# Patient Record
Sex: FEMALE | Employment: FULL TIME | URBAN - METROPOLITAN AREA
[De-identification: names, ages, dates, MRNs, and addresses within clinical notes are randomized per-mention and may not be internally consistent; named-entity substitution may affect disease eponyms.]

---

## 2023-04-14 PROBLEM — R31.9 HEMATURIA: Status: ACTIVE | Noted: 2023-04-14

## 2023-04-14 PROBLEM — J03.90 ACUTE TONSILLITIS: Status: RESOLVED | Noted: 2023-04-14 | Resolved: 2023-04-14

## 2023-04-14 PROBLEM — I10 ESSENTIAL HYPERTENSION: Status: ACTIVE | Noted: 2023-04-14

## 2023-04-14 PROBLEM — J02.9 SORE THROAT: Status: RESOLVED | Noted: 2023-04-14 | Resolved: 2023-04-14

## 2023-04-14 PROBLEM — R79.89 HIGH SERUM CHOLESTANOL: Status: ACTIVE | Noted: 2023-04-14

## 2023-04-14 PROBLEM — L72.9 SKIN CYST: Status: RESOLVED | Noted: 2023-04-14 | Resolved: 2023-04-14

## 2023-04-14 PROBLEM — N00.9 GLOMERULONEPHRITIS, ACUTE: Status: RESOLVED | Noted: 2023-04-14 | Resolved: 2023-04-14

## 2023-04-14 PROBLEM — R80.9 PROTEINURIA: Status: ACTIVE | Noted: 2023-04-14

## 2023-04-14 PROBLEM — R09.A2 GLOBUS SENSATION: Status: ACTIVE | Noted: 2023-04-14

## 2023-04-14 PROBLEM — N02.B9 IGA NEPHROPATHY: Status: ACTIVE | Noted: 2023-04-14

## 2023-04-14 RX ORDER — LISINOPRIL 10 MG/1
1 TABLET ORAL DAILY
COMMUNITY
Start: 2018-05-24 | End: 2023-11-30

## 2023-04-18 ENCOUNTER — OFFICE VISIT (OUTPATIENT)
Dept: PRIMARY CARE | Facility: CLINIC | Age: 43
End: 2023-04-18
Payer: COMMERCIAL

## 2023-04-18 VITALS
DIASTOLIC BLOOD PRESSURE: 75 MMHG | BODY MASS INDEX: 22.71 KG/M2 | HEIGHT: 64 IN | HEART RATE: 86 BPM | WEIGHT: 133 LBS | SYSTOLIC BLOOD PRESSURE: 110 MMHG

## 2023-04-18 DIAGNOSIS — I10 ESSENTIAL HYPERTENSION: ICD-10-CM

## 2023-04-18 DIAGNOSIS — Z12.31 SCREENING MAMMOGRAM, ENCOUNTER FOR: ICD-10-CM

## 2023-04-18 DIAGNOSIS — N02.B9 IGA NEPHROPATHY: ICD-10-CM

## 2023-04-18 DIAGNOSIS — Z13.220 LIPID SCREENING: ICD-10-CM

## 2023-04-18 DIAGNOSIS — Z13.29 THYROID DISORDER SCREENING: ICD-10-CM

## 2023-04-18 DIAGNOSIS — Z00.00 ROUTINE GENERAL MEDICAL EXAMINATION AT A HEALTH CARE FACILITY: Primary | ICD-10-CM

## 2023-04-18 PROCEDURE — 84439 ASSAY OF FREE THYROXINE: CPT

## 2023-04-18 PROCEDURE — 85025 COMPLETE CBC W/AUTO DIFF WBC: CPT

## 2023-04-18 PROCEDURE — 1036F TOBACCO NON-USER: CPT | Performed by: FAMILY MEDICINE

## 2023-04-18 PROCEDURE — 36415 COLL VENOUS BLD VENIPUNCTURE: CPT | Performed by: FAMILY MEDICINE

## 2023-04-18 PROCEDURE — 99386 PREV VISIT NEW AGE 40-64: CPT | Performed by: FAMILY MEDICINE

## 2023-04-18 PROCEDURE — 80053 COMPREHEN METABOLIC PANEL: CPT

## 2023-04-18 PROCEDURE — 3074F SYST BP LT 130 MM HG: CPT | Performed by: FAMILY MEDICINE

## 2023-04-18 PROCEDURE — 84443 ASSAY THYROID STIM HORMONE: CPT

## 2023-04-18 PROCEDURE — 3078F DIAST BP <80 MM HG: CPT | Performed by: FAMILY MEDICINE

## 2023-04-18 PROCEDURE — 80061 LIPID PANEL: CPT

## 2023-04-18 ASSESSMENT — PATIENT HEALTH QUESTIONNAIRE - PHQ9
SUM OF ALL RESPONSES TO PHQ9 QUESTIONS 1 AND 2: 0
1. LITTLE INTEREST OR PLEASURE IN DOING THINGS: NOT AT ALL
2. FEELING DOWN, DEPRESSED OR HOPELESS: NOT AT ALL

## 2023-04-18 NOTE — PROGRESS NOTES
"Reason for Visit: Annual Physical Exam    HPI: Patient is here to establish care has history of IgA nephropathy takes lisinopril blood pressures controlled sees nephrology has not seen primary care in a while  Has had new onset irregular periods spacing out by 45 days sometimes heavy images new has not had a Pap smear needs a mammogram order has not had colonoscopy is up-to-date immunization  Mom with thyroid    Active Problem List  Patient Active Problem List   Diagnosis    Essential hypertension    Globus sensation    Hematuria    High serum cholestanol    IgA nephropathy    Proteinuria    Routine general medical examination at a health care facility    Abnormal uterine bleeding       Comprehensive Medical/Surgical/Social/Family History  Past Medical History:   Diagnosis Date    Acute tonsillitis 04/14/2023    Glomerulonephritis, acute 04/14/2023    Other specified health status     No pertinent past medical history    Skin cyst 04/14/2023    Sore throat 04/14/2023     Past Surgical History:   Procedure Laterality Date    OTHER SURGICAL HISTORY  03/21/2019    No history of surgery     Social History     Social History Narrative    Not on file         Allergies and Medications  Patient has no known allergies.  Current Outpatient Medications on File Prior to Visit   Medication Sig Dispense Refill    fish oil concentrate (Omega-3) 120-180 mg capsule Take 1 capsule (1 g) by mouth in the morning and 1 capsule (1 g) before bedtime.      lisinopril 10 mg tablet Take 1 tablet (10 mg) by mouth once daily.       No current facility-administered medications on file prior to visit.         ROS otherwise negative aside from what was mentioned above in HPI.    Vitals  Blood Pressure 110/75   Pulse 86   Height 1.626 m (5' 4\")   Weight 60.3 kg (133 lb)   Body Mass Index 22.83 kg/m²   Body mass index is 22.83 kg/m².  Physical Exam  Gen: Alert, NAD  HEENT:  PERRLA, EOMI, conjunctiva and sclera normal in appearance. External " auditory canals/TMs normal; Oral cavity and posterior pharynx without lesions/exudate  Neck:  Supple with FROM; No masses/nodes palpable; Thyroid nontender and without nodules; No NEVILLE  Respiratory:  Lungs CTAB  Cardiovascular:  Heart RRR. No M/R/G. Peripheral pulses equal bilaterally  Abdomen:  Soft, nontender, BS present throughout; No R/G/R; No HSM or masses palpated  Extremities:  FROM all extremities; Muscle strength grossly normal with good tone  Neuro:  CN II-XII intact; Reflexes 2+/2+; Gross motor and sensory intact  Skin:  No suspicious lesions present    Assessment and Plan:  Problem List Items Addressed This Visit       Essential hypertension    Relevant Orders    Comprehensive metabolic panel (Completed)    CBC and Auto Differential (Completed)    Lipid Panel (Completed)    TSH with reflex to Free T4 if abnormal (Completed)    Thyroxine, Free (Completed)    IgA nephropathy    Routine general medical examination at a health care facility - Primary    Current Assessment & Plan     -Nutrition: Stressed importance of moderation in sodium/caffeine intake, saturated fat and cholesterol, caloric balance, sufficient intake of fresh fruits, vegetables, fiber, ---Exercise: Stressed the importance of regular exercise.   --Injury prevention: Discussed safety belts, safety helmets, smoke detector, smoking near bedding or upholstery.   --Dental health: Discussed importance of regular tooth brushing, flossing, and dental visits.  --Immunizations reviewed.  --Discussed benefits of screening colonoscopy.  --After hours service discussed with patient           Other Visit Diagnoses       Screening mammogram, encounter for        Relevant Orders    BI mammo bilateral screening tomosynthesis    Thyroid disorder screening        Relevant Orders    Comprehensive metabolic panel (Completed)    CBC and Auto Differential (Completed)    TSH with reflex to Free T4 if abnormal (Completed)    Thyroxine, Free (Completed)    Lipid  screening        Relevant Orders    Comprehensive metabolic panel (Completed)    CBC and Auto Differential (Completed)    Lipid Panel (Completed)

## 2023-04-19 LAB
ALANINE AMINOTRANSFERASE (SGPT) (U/L) IN SER/PLAS: 14 U/L (ref 7–45)
ALBUMIN (G/DL) IN SER/PLAS: 4 G/DL (ref 3.4–5)
ALKALINE PHOSPHATASE (U/L) IN SER/PLAS: 43 U/L (ref 33–110)
ANION GAP IN SER/PLAS: 11 MMOL/L (ref 10–20)
ASPARTATE AMINOTRANSFERASE (SGOT) (U/L) IN SER/PLAS: 19 U/L (ref 9–39)
BASOPHILS (10*3/UL) IN BLOOD BY AUTOMATED COUNT: 0.04 X10E9/L (ref 0–0.1)
BASOPHILS/100 LEUKOCYTES IN BLOOD BY AUTOMATED COUNT: 0.7 % (ref 0–2)
BILIRUBIN TOTAL (MG/DL) IN SER/PLAS: 0.5 MG/DL (ref 0–1.2)
CALCIUM (MG/DL) IN SER/PLAS: 9.2 MG/DL (ref 8.6–10.6)
CARBON DIOXIDE, TOTAL (MMOL/L) IN SER/PLAS: 25 MMOL/L (ref 21–32)
CHLORIDE (MMOL/L) IN SER/PLAS: 107 MMOL/L (ref 98–107)
CHOLESTEROL (MG/DL) IN SER/PLAS: 182 MG/DL (ref 0–199)
CHOLESTEROL IN HDL (MG/DL) IN SER/PLAS: 54 MG/DL
CHOLESTEROL/HDL RATIO: 3.4
CREATININE (MG/DL) IN SER/PLAS: 0.67 MG/DL (ref 0.5–1.05)
EOSINOPHILS (10*3/UL) IN BLOOD BY AUTOMATED COUNT: 0.15 X10E9/L (ref 0–0.7)
EOSINOPHILS/100 LEUKOCYTES IN BLOOD BY AUTOMATED COUNT: 2.7 % (ref 0–6)
ERYTHROCYTE DISTRIBUTION WIDTH (RATIO) BY AUTOMATED COUNT: 13.2 % (ref 11.5–14.5)
ERYTHROCYTE MEAN CORPUSCULAR HEMOGLOBIN CONCENTRATION (G/DL) BY AUTOMATED: 31.1 G/DL (ref 32–36)
ERYTHROCYTE MEAN CORPUSCULAR VOLUME (FL) BY AUTOMATED COUNT: 92 FL (ref 80–100)
ERYTHROCYTES (10*6/UL) IN BLOOD BY AUTOMATED COUNT: 3.9 X10E12/L (ref 4–5.2)
GFR FEMALE: >90 ML/MIN/1.73M2
GLUCOSE (MG/DL) IN SER/PLAS: 74 MG/DL (ref 74–99)
HEMATOCRIT (%) IN BLOOD BY AUTOMATED COUNT: 36 % (ref 36–46)
HEMOGLOBIN (G/DL) IN BLOOD: 11.2 G/DL (ref 12–16)
IMMATURE GRANULOCYTES/100 LEUKOCYTES IN BLOOD BY AUTOMATED COUNT: 0.2 % (ref 0–0.9)
LDL: 108 MG/DL (ref 0–99)
LEUKOCYTES (10*3/UL) IN BLOOD BY AUTOMATED COUNT: 5.5 X10E9/L (ref 4.4–11.3)
LYMPHOCYTES (10*3/UL) IN BLOOD BY AUTOMATED COUNT: 2.16 X10E9/L (ref 1.2–4.8)
LYMPHOCYTES/100 LEUKOCYTES IN BLOOD BY AUTOMATED COUNT: 39.1 % (ref 13–44)
MONOCYTES (10*3/UL) IN BLOOD BY AUTOMATED COUNT: 0.38 X10E9/L (ref 0.1–1)
MONOCYTES/100 LEUKOCYTES IN BLOOD BY AUTOMATED COUNT: 6.9 % (ref 2–10)
NEUTROPHILS (10*3/UL) IN BLOOD BY AUTOMATED COUNT: 2.78 X10E9/L (ref 1.2–7.7)
NEUTROPHILS/100 LEUKOCYTES IN BLOOD BY AUTOMATED COUNT: 50.4 % (ref 40–80)
NRBC (PER 100 WBCS) BY AUTOMATED COUNT: 0 /100 WBC (ref 0–0)
PLATELETS (10*3/UL) IN BLOOD AUTOMATED COUNT: 246 X10E9/L (ref 150–450)
POTASSIUM (MMOL/L) IN SER/PLAS: 4.1 MMOL/L (ref 3.5–5.3)
PROTEIN TOTAL: 6.8 G/DL (ref 6.4–8.2)
SODIUM (MMOL/L) IN SER/PLAS: 139 MMOL/L (ref 136–145)
THYROTROPIN (MIU/L) IN SER/PLAS BY DETECTION LIMIT <= 0.05 MIU/L: 0.22 MIU/L (ref 0.44–3.98)
THYROXINE (T4) FREE (NG/DL) IN SER/PLAS: 1.17 NG/DL (ref 0.78–1.48)
TRIGLYCERIDE (MG/DL) IN SER/PLAS: 102 MG/DL (ref 0–149)
UREA NITROGEN (MG/DL) IN SER/PLAS: 13 MG/DL (ref 6–23)
VLDL: 20 MG/DL (ref 0–40)

## 2023-04-24 DIAGNOSIS — E05.90 HYPERTHYROIDISM: Primary | ICD-10-CM

## 2023-04-30 PROBLEM — Z13.29 THYROID DISORDER SCREENING: Status: ACTIVE | Noted: 2023-04-30

## 2023-04-30 PROBLEM — N93.9 ABNORMAL UTERINE BLEEDING: Status: ACTIVE | Noted: 2023-04-30

## 2023-04-30 PROBLEM — Z13.220 LIPID SCREENING: Status: ACTIVE | Noted: 2023-04-30

## 2023-04-30 PROBLEM — Z00.00 ROUTINE GENERAL MEDICAL EXAMINATION AT A HEALTH CARE FACILITY: Status: ACTIVE | Noted: 2023-04-30

## 2023-04-30 PROBLEM — Z12.31 SCREENING MAMMOGRAM, ENCOUNTER FOR: Status: ACTIVE | Noted: 2023-04-30

## 2023-05-01 ENCOUNTER — TELEPHONE (OUTPATIENT)
Dept: PRIMARY CARE | Facility: CLINIC | Age: 43
End: 2023-05-01
Payer: COMMERCIAL

## 2023-05-01 NOTE — TELEPHONE ENCOUNTER
----- Message from Barry Dahl MD sent at 5/1/2023  3:25 PM EDT -----  Please call the patient regarding her abnormal result.  THYROID shows goiter but no nodules.   Get the other labs as ordered

## 2023-05-02 ENCOUNTER — TELEPHONE (OUTPATIENT)
Dept: PRIMARY CARE | Facility: CLINIC | Age: 43
End: 2023-05-02
Payer: COMMERCIAL

## 2023-05-03 ENCOUNTER — TELEPHONE (OUTPATIENT)
Dept: PRIMARY CARE | Facility: CLINIC | Age: 43
End: 2023-05-03
Payer: COMMERCIAL

## 2023-05-05 ENCOUNTER — LAB (OUTPATIENT)
Dept: LAB | Facility: LAB | Age: 43
End: 2023-05-05
Payer: COMMERCIAL

## 2023-05-05 DIAGNOSIS — E05.90 HYPERTHYROIDISM: ICD-10-CM

## 2023-05-05 LAB — THYROPEROXIDASE AB (IU/ML) IN SER/PLAS: <28 IU/ML

## 2023-05-05 PROCEDURE — 84445 ASSAY OF TSI GLOBULIN: CPT

## 2023-05-05 PROCEDURE — 84442 ASSAY OF THYROID ACTIVITY: CPT

## 2023-05-05 PROCEDURE — 86376 MICROSOMAL ANTIBODY EACH: CPT

## 2023-05-05 PROCEDURE — 36415 COLL VENOUS BLD VENIPUNCTURE: CPT

## 2023-05-05 PROCEDURE — 83519 RIA NONANTIBODY: CPT

## 2023-05-08 LAB
THYROXINE BINDING GLOBULIN: 16 UG/ML (ref 13–30)
TSH RECEPTOR ANTIBODY: <0.8 IU/L

## 2023-05-11 ENCOUNTER — TELEPHONE (OUTPATIENT)
Dept: PRIMARY CARE | Facility: CLINIC | Age: 43
End: 2023-05-11
Payer: COMMERCIAL

## 2023-05-11 LAB — THYROID STIMULATING IMMUNOGLOBULIN: <1 TSI INDEX

## 2023-05-12 ENCOUNTER — TELEPHONE (OUTPATIENT)
Dept: PRIMARY CARE | Facility: CLINIC | Age: 43
End: 2023-05-12
Payer: COMMERCIAL

## 2023-05-12 NOTE — TELEPHONE ENCOUNTER
Left detailed message on normal labs and negative thyroid labs       ----- Message from Barry Dahl MD sent at 5/12/2023  4:25 PM EDT -----  Please advise patient labs are normal  Negative antibodies for Thyroid   Will continue to monitor every 6 months to year depending on symptoms.

## 2023-11-27 DIAGNOSIS — I10 ESSENTIAL HYPERTENSION: Primary | ICD-10-CM

## 2023-11-30 DIAGNOSIS — I10 ESSENTIAL HYPERTENSION: ICD-10-CM

## 2023-11-30 RX ORDER — LISINOPRIL 10 MG/1
10 TABLET ORAL DAILY
Qty: 90 TABLET | Refills: 3 | Status: SHIPPED | OUTPATIENT
Start: 2023-11-30 | End: 2023-11-30 | Stop reason: SDUPTHER

## 2023-11-30 RX ORDER — LISINOPRIL 10 MG/1
10 TABLET ORAL DAILY
Qty: 90 TABLET | Refills: 3 | Status: SHIPPED | OUTPATIENT
Start: 2023-11-30 | End: 2023-12-01 | Stop reason: SDUPTHER

## 2023-12-01 DIAGNOSIS — I10 ESSENTIAL HYPERTENSION: ICD-10-CM

## 2023-12-01 RX ORDER — LISINOPRIL 10 MG/1
10 TABLET ORAL DAILY
Qty: 90 TABLET | Refills: 3 | Status: SHIPPED | OUTPATIENT
Start: 2023-12-01 | End: 2024-11-30

## 2024-11-07 DIAGNOSIS — I10 ESSENTIAL HYPERTENSION: Primary | ICD-10-CM

## 2024-11-07 RX ORDER — LISINOPRIL 10 MG/1
10 TABLET ORAL DAILY
Qty: 90 TABLET | Refills: 0 | Status: SHIPPED | OUTPATIENT
Start: 2024-11-07

## 2024-12-01 DIAGNOSIS — I10 ESSENTIAL HYPERTENSION: ICD-10-CM

## 2024-12-01 RX ORDER — LISINOPRIL 10 MG/1
10 TABLET ORAL DAILY
Qty: 90 TABLET | Refills: 3 | Status: SHIPPED | OUTPATIENT
Start: 2024-12-01

## 2025-09-11 ENCOUNTER — APPOINTMENT (OUTPATIENT)
Dept: PRIMARY CARE | Facility: CLINIC | Age: 45
End: 2025-09-11
Payer: COMMERCIAL